# Patient Record
Sex: MALE | Race: WHITE
[De-identification: names, ages, dates, MRNs, and addresses within clinical notes are randomized per-mention and may not be internally consistent; named-entity substitution may affect disease eponyms.]

---

## 2019-07-17 ENCOUNTER — HOSPITAL ENCOUNTER (EMERGENCY)
Dept: HOSPITAL 56 - MW.ED | Age: 19
Discharge: HOME | End: 2019-07-17
Payer: COMMERCIAL

## 2019-07-17 DIAGNOSIS — Z88.7: ICD-10-CM
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
## 2019-07-17 DIAGNOSIS — S61.012A: Primary | ICD-10-CM

## 2019-07-17 DIAGNOSIS — Z23: ICD-10-CM

## 2019-07-17 PROCEDURE — 90471 IMMUNIZATION ADMIN: CPT

## 2019-07-17 PROCEDURE — 90715 TDAP VACCINE 7 YRS/> IM: CPT

## 2019-07-17 PROCEDURE — 99282 EMERGENCY DEPT VISIT SF MDM: CPT

## 2019-07-17 PROCEDURE — 12001 RPR S/N/AX/GEN/TRNK 2.5CM/<: CPT

## 2019-07-17 NOTE — EDM.PDOC
ED HPI GENERAL MEDICAL PROBLEM





- General


Stated Complaint: LEFT THUMB CUT


Time Seen by Provider: 07/17/19 08:50


Source of Information: Reports: Patient


History Limitations: Reports: No Limitations





- History of Present Illness


INITIAL COMMENTS - FREE TEXT/NARRATIVE: 


HISTORY AND PHYSICAL:





History of present illness:


Patient is a 19-year-old male who presents to the emergency room today with 

complaints of a laceration to his distal left thumb. Patient was working with a 

piece of tin when it slipped resulting in the laceration. Unsure of his last 

tetanus





Review of systems: 


As per history of present illness and below otherwise all systems reviewed and 

negative.





Past medical history: 


As per history of present illness and as reviewed below otherwise 

noncontributory.





Surgical history: 


As per history of present illness and as reviewed below otherwise 

noncontributory.





Social history: 


See social history for further information





Family history: 


As per history of present illness and as reviewed below otherwise 

noncontributory.





Physical exam:


General: Well-developed and well-nourished 19-year-old male. Alert and 

oriented. Nontoxic appearing and in no acute distress.


HEENT: Atraumatic, normocephalic, pupils equal and reactive bilaterally, 

negative for conjunctival pallor or scleral icterus, mucous membranes moist, 

trachea midline. No drooling or trismus noted. No meningeal signs. No hot 

potato voice noted. 


Lungs: Clear to auscultation, breath sounds equal bilaterally, chest nontender.


Heart: S1S2, regular rate and rhythm without overt murmur


Abdomen: Soft, nondistended, nontender. 


Skin: 2cm laceration to the left distal thumb linear adjacent to the nail bed 

and not involving the nailbed. Otherwise skin is intact, warm, dry. No lesions 

or rashes noted.


Extremities: See skin for details, moves all extremities per self without 

difficulty or deficits. Neurovascular unremarkable.


Neuro: Awake, alert, oriented. Cranial nerves II through XII unremarkable. 

Cerebellum unremarkable. Motor and sensory unremarkable throughout. Exam 

nonfocal.





Notes:


1% lidocaine was used to anesthetize the area. Chlorhexidine and wound wash 

were used for cleansing. Usual and customary procedures were followed for 

suture placement. 4-0 nylon, #5 interrupted sutures were placed. Patient 

tolerated well. Nonstick bacitracin dressing applied. Wound and supportive care 

measures were reviewed and discussed. Voices understanding and is agreeable to 

plan of care. Denies any further questions or concerns at this time.





Diagnostics:


None





Therapeutics:


Tdap, wound care, 1% lidocaine





Prescription:


None





Impression: 


Laceration





Plan:


1. Keep the area clean and dry. Continue to monitor for signs of infection. 

Sutures to be removed in 7-10 days.


2. Tylenol and/or ibuprofen as needed for pain management.


3. Please follow-up with your primary care provider in the next 1-2 days. 

Return to the ED as needed and as discussed.





Definitive disposition and diagnosis as appropriate pending reevaluation and 

review of above.





  ** left thumb


Pain Score (Numeric/FACES): 8





- Related Data


 Allergies











Allergy/AdvReac Type Severity Reaction Status Date / Time


 


Influenza Virus Vaccines Allergy  Hives Verified 07/17/19 08:53











Home Meds: 


 Home Meds





. [No Known Home Meds]  07/17/19 [History]











ED ROS GENERAL





- Review of Systems


Review Of Systems: ROS reveals no pertinent complaints other than HPI.





ED EXAM, SKIN/RASH


Exam: See Below (See dictation)





ED SKIN PROCEDURES





- Laceration/Wound Repair


  ** Left thumb


Lac/Wound length In cm: 2


Appearance: Subcutaneous, Linear, Clean


Distal NVT: Neuro & Vascular Intact, No Tendon Injury


Anesthetic Type: Local


Local Anesthesia - Lidocaine (Xylocaine): 1% Plain


Local Anesthetic Volume: 3cc


Skin Prep: Chlorhexidine (Hibiciens), Saline


Saline Irrigation (cc's): 25


Exploration/Debridement/Repair: Wound Explored, Explored to Base, No Foreign 

Material Found


Suture Size: 4-0


# of Sutures: 5


Suture Type: Nylon, Interrupted, Simple


Drain Placement: No


Sterile Dressing Applied: Provider


Tetanus Status Addressed: Yes


Complications: No





Course





- Vital Signs


Last Recorded V/S: 


 Last Vital Signs











Temp  96.8 F   07/17/19 08:53


 


Pulse  115 H  07/17/19 08:53


 


Resp  18   07/17/19 08:53


 


BP  132/92 H  07/17/19 08:53


 


Pulse Ox  99   07/17/19 08:53














- Orders/Labs/Meds


Orders: 


 Active Orders 24 hr











 Category Date Time Status


 


 Vaccines to be Administered [RC] PER UNIT ROUTINE Care  07/17/19 08:51 Ordered











Meds: 


Medications














Discontinued Medications














Generic Name Dose Route Start Last Admin





  Trade Name Freq  PRN Reason Stop Dose Admin


 


Diphtheria/Tetanus/Acell Pertussis  0.5 ml  07/17/19 08:51  07/17/19 09:00





  Adacel  IM  07/17/19 08:52  0.5 ml





  .ONCE ONE   Administration





     





     





     





     


 


Lidocaine HCl  5 ml  07/17/19 08:51  07/17/19 08:59





  Xylocaine-Mpf 1%  INJECT  07/17/19 08:52  5 ml





  ONETIME ONE   Administration





     





     





     





     














Departure





- Departure


Time of Disposition: 09:13


Disposition: Home, Self-Care 01


Clinical Impression: 


 Laceration








- Discharge Information


Additional Instructions: 


The following information is given to patients seen in the emergency department 

who are being discharged to home. This information is to outline your options 

for follow-up care. We provide all patients seen in our emergency department 

with a follow-up referral.





The need for follow-up, as well as the timing and circumstances, are variable 

depending upon the specifics of your emergency department visit.





If you don't have a primary care physician on staff, we will provide you with a 

referral. We always advise you to contact your personal physician following an 

emergency department visit to inform them of the circumstance of the visit and 

for follow-up with them and/or the need for any referrals to a consulting 

specialist.





The emergency department will also refer you to a specialist when appropriate. 

This referral assures that you have the opportunity for follow-up care with a 

specialist. All of these measure are taken in an effort to provide you with 

optimal care, which includes your follow-up.





Under all circumstances we always encourage you to contact your private 

physician who remains a resource for coordinating your care. When calling for 

follow-up care, please make the office aware that this follow-up is from your 

recent emergency room visit. If for any reason you are refused follow-up, 

please contact the [facility] Emergency 

Department at [phone] and asked to speak to the emergency department 

charge nurse.





[facility]


Primary Care


[address] [place] [id number]


Phone: [phone]


Fax: [phone]





[facility]


[address] [place] [address]


Phone: [phone]


Fax: [phone]





1. Keep the area clean and dry. Continue to monitor for signs of infection. 

Sutures to be removed in 7-10 days.


2. Tylenol and/or ibuprofen as needed for pain management.


3. Please follow-up with your primary care provider in the next 1-2 days. 

Return to the ED as needed and as discussed.





- My Orders


Last 24 Hours: 


My Active Orders





07/17/19 08:51


Vaccines to be Administered [RC] PER UNIT ROUTINE 














- Assessment/Plan


Last 24 Hours: 


My Active Orders





07/17/19 08:51


Vaccines to be Administered [RC] PER UNIT ROUTINE